# Patient Record
Sex: MALE | Race: WHITE
[De-identification: names, ages, dates, MRNs, and addresses within clinical notes are randomized per-mention and may not be internally consistent; named-entity substitution may affect disease eponyms.]

---
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
## 2022-11-29 NOTE — ED
Upper Extremity Pain





- HPI Summary


HPI Summary: 





Complains of left elbow pain and swelling after being hit in the elbow this 

morning by a hardball traveling around 85 miles per hour.  Denies any other 

injuries, loss of sensation or function distally.  History is none.





- History of Current Complaint


Chief Complaint: EDExtremityUpper


Stated Complaint: ELBOW INJURY


Time Seen by Provider: 06/03/18 21:08


Hx Obtained From: Patient





- Allergies/Home Medications


Allergies/Adverse Reactions: 


 Allergies











Allergy/AdvReac Type Severity Reaction Status Date / Time


 


No Known Allergies Allergy   Verified 06/03/18 21:02














PMH/Surg Hx/FS Hx/Imm Hx


Musculoskeletal History: 


   Denies: Hx Rheumatoid Arthritis, Hx Osteoporosis


Infectious Disease History: No


Infectious Disease History: 


   Denies: Traveled Outside the [place] in Last 30 Days





- Social History


Alcohol Use: None


Substance Use Type: Reports: None


Smoking Status (MU): Never Smoked Tobacco





Review of Systems


Constitutional: Negative


Eyes: Negative


ENT: Negative


Cardiovascular: Negative


Respiratory: Negative


Gastrointestinal: Negative


Genitourinary: Negative


Positive: Arthralgia


Skin: Negative


Neurological: Negative


Psychological: Normal


All Other Systems Reviewed And Are Negative: Yes





Physical Exam





- Summary


Physical Exam Summary: 





 Redness and swelling to left elbow. Mild limitation to range of motion at full 

flexion.  Full flexion of left wrist, left shoulder, left hand without any 

indication of pain.  PMS intact distally


Triage Information Reviewed: Yes


Vital Signs On Initial Exam: 


 Initial Vitals











Temp Pulse Resp BP Pulse Ox


 


 97.1 F   69   16   123/68   98 


 


 06/03/18 21:00  06/03/18 21:00  06/03/18 21:00  06/03/18 21:00  06/03/18 21:00











Vital Signs Reviewed: Yes


Appearance: Positive: Well-Appearing


Skin: Positive: Warm


Head/Face: Positive: Normal Head/Face Inspection


Eyes: Positive: Normal


Neck: Positive: Supple


Respiratory/Lung Sounds: Positive: Clear to Auscultation


Cardiovascular: Positive: Normal


Abdomen Description: Positive: Nontender


Musculoskeletal: Positive: Normal


Neurological: Positive: Normal


Psychiatric: Positive: Normal


AVPU Assessment: Alert





- Michell Coma Scale


Best Eye Response: 4 - Spontaneous


Best Motor Response: 6 - Obeys Commands


Best Verbal Response: 5 - Oriented


Coma Scale Total: 15





Diagnostics





- Vital Signs


 Vital Signs











  Temp Pulse Resp BP Pulse Ox


 


 06/03/18 21:00  97.1 F  69  16  123/68  98














- Laboratory


Lab Statement: Any lab studies that have been ordered have been reviewed, and 

results considered in the medical decision making process.





- Radiology


  ** elbow


Xray Interpretation: No Acute Changes


Radiology Interpretation Completed By: Radiologist





Course/Dx





- Course


Course Of Treatment: Complains of left elbow pain and swelling after being hit 

in the elbow this morning by a hardball traveling around 85 miles per hour.  

Denies any other injuries, loss of sensation or function distally.  History is 

none.  Redness and swelling to left elbow. Mild limitation to range of motion 

at full flexion.  Full flexion of left wrist, left shoulder, left hand without 

any indication of pain.  PMS intact distally.  X-ray negative for dislocation, 

fracture.  Patient normally has sling.  Recommend follow-up with orthopedics if 

pain does not improve in 4-5 days





- Diagnoses


Provider Diagnoses: 


 Left elbow contusion








Discharge





- Sign-Out/Discharge


Documenting (check all that apply): Discharge/Admit/Transfer





- Discharge Plan


Condition: Stable


Disposition: HOME


Patient Education Materials:  Contusion in Adults (ED)


Referrals: 


Alta Roy MD [Primary Care Provider] - 


Israel Veliz MD [Medical Doctor] - 


Additional Instructions: 


Ice 10-15 minutes per hour.  Ibuprofen for swelling and pain.  Rest elbow.  If 

symptoms do not improve in 3-4 days follow-up with orthopedics.  Return to the 

ED for any new or worsening symptoms





- Billing Disposition and Condition


Condition: STABLE


Disposition: HOME weakness